# Patient Record
Sex: MALE | Race: WHITE | NOT HISPANIC OR LATINO | Employment: FULL TIME | URBAN - METROPOLITAN AREA
[De-identification: names, ages, dates, MRNs, and addresses within clinical notes are randomized per-mention and may not be internally consistent; named-entity substitution may affect disease eponyms.]

---

## 2023-12-05 ENCOUNTER — APPOINTMENT (OUTPATIENT)
Dept: HEMATOLOGY/ONCOLOGY | Facility: HOSPITAL | Age: 43
End: 2023-12-05
Payer: COMMERCIAL

## 2024-01-02 ENCOUNTER — APPOINTMENT (OUTPATIENT)
Dept: HEMATOLOGY/ONCOLOGY | Facility: HOSPITAL | Age: 44
End: 2024-01-02

## 2024-01-18 ASSESSMENT — DERMATOLOGY QUALITY OF LIFE (QOL) ASSESSMENT
WHAT SINGLE SKIN CONDITION LISTED BELOW IS THE PATIENT ANSWERING THE QUALITY-OF-LIFE ASSESSMENT QUESTIONS ABOUT: NONE OF THE ABOVE
RATE HOW BOTHERED YOU ARE BY SYMPTOMS OF YOUR SKIN PROBLEM (EG, ITCHING, STINGING BURNING, HURTING OR SKIN IRRITATION): 0 - NEVER BOTHERED
RATE HOW BOTHERED YOU ARE BY SYMPTOMS OF YOUR SKIN PROBLEM (EG, ITCHING, STINGING BURNING, HURTING OR SKIN IRRITATION): 0 - NEVER BOTHERED
RATE HOW EMOTIONALLY BOTHERED YOU ARE BY YOUR SKIN PROBLEM (FOR EXAMPLE, WORRY, EMBARRASSMENT, FRUSTRATION): 0 - NEVER BOTHERED
RATE HOW EMOTIONALLY BOTHERED YOU ARE BY YOUR SKIN PROBLEM (FOR EXAMPLE, WORRY, EMBARRASSMENT, FRUSTRATION): 0 - NEVER BOTHERED
RATE HOW BOTHERED YOU ARE BY EFFECTS OF YOUR SKIN PROBLEMS ON YOUR ACTIVITIES (EG, GOING OUT, ACCOMPLISHING WHAT YOU WANT, WORK ACTIVITIES OR YOUR RELATIONSHIPS WITH OTHERS): 0 - NEVER BOTHERED
WHAT SINGLE SKIN CONDITION LISTED BELOW IS THE PATIENT ANSWERING THE QUALITY-OF-LIFE ASSESSMENT QUESTIONS ABOUT: NONE OF THE ABOVE
RATE HOW BOTHERED YOU ARE BY EFFECTS OF YOUR SKIN PROBLEMS ON YOUR ACTIVITIES (EG, GOING OUT, ACCOMPLISHING WHAT YOU WANT, WORK ACTIVITIES OR YOUR RELATIONSHIPS WITH OTHERS): 0 - NEVER BOTHERED

## 2024-01-18 ASSESSMENT — PATIENT GLOBAL ASSESSMENT (PGA): WHAT IS THE PGA: PATIENT GLOBAL ASSESSMENT:  1 - CLEAR

## 2024-01-22 ENCOUNTER — OFFICE VISIT (OUTPATIENT)
Dept: DERMATOLOGY | Facility: CLINIC | Age: 44
End: 2024-01-22
Payer: COMMERCIAL

## 2024-01-22 DIAGNOSIS — L81.4 LENTIGO: ICD-10-CM

## 2024-01-22 PROCEDURE — 99214 OFFICE O/P EST MOD 30 MIN: CPT | Performed by: SPECIALIST

## 2024-01-22 NOTE — PROGRESS NOTES
Patient is here for a full body exam. Full body exam done in the presence of chaperone. Eyes:  Conjunctiva normal lids normal. Mouth and throat: Lips normal teeth normal gums normal.  Oropharynx is moist and normal. Neck: Neck is supple without masses. CV: Extremities are  normal without calf tenderness edema varicosities. Abdomen: Is no hepatosplenomegaly.  Lymphatic: Is no cervical axillary or inguinal lymphadenopathy. Extremities: Inspection palpation  of digits and nails is normal without clubbing or cyanosis. Neuro/psych: Orientation to time,  place, person situation is normal. Mood/affect is normal. Skin: Palpation of scalp is normal  inspection of Hair and scalp, eyebrows, face, and extremities normal. Inspection/palpation of  Head/face mild photo damage. Neck mild photo damage. Chest mild photo damage. Breasts are  normal axillary vaults are normal. Abdomen normal.   Genitalia normal groin normal, buttocks normal. Back mild photo damage. Right upper extremity photo damage. Left upper extremity photo damage. Right lower extremity photo damage. Left lower extremity normal. Inspection of eccrine apocrine glands of skin and subcutaneous tissues normal.

## 2024-01-22 NOTE — PROGRESS NOTES
Yuridia Camacho is a 43 y.o. male who presents for the following: Body Exam.     Review of Systems:  No other skin or systemic complaints other than what is documented elsewhere in the note.    The following portions of the chart were reviewed this encounter and updated as appropriate:            Specialty Problems    None       Objective   Well appearing patient in no apparent distress; mood and affect are within normal limits.    A focused skin examination was performed. All findings within normal limits unless otherwise noted below.    Assessment/Plan   1. Lentigo        Assessment/Plan: No lesions suspicious for malignancy are noted.  Patient does have mild to moderate photodamage involving his chest and back.    Patient is here for a full body exam. Full body exam done in the presence of chaperone.     Eyes: Conjunctiva normal lids normal. Mouth and throat: Lips normal teeth normal gums normal.  Oropharynx is moist and normal.   Neck: Neck is supple without masses.   CV: Extremities are  normal without calf tenderness edema varicosities.   Abdomen: Is no hepatosplenomegaly.  Lymphatic: Is no cervical axillary or inguinal lymphadenopathy.   Extremities: Inspection palpation  of digits and nails is normal without clubbing or cyanosis.   Neuro/psych: Orientation to time,  place, person situation is normal.   Mood/affect is normal.   Skin: Palpation of scalp is normal  inspection of Hair and scalp, eyebrows, face, and extremities normal. Inspection/palpation of  Head/face mild photo damage. Neck mild photo damage. Chest mild photo damage. Breasts are  normal axillary vaults are normal. Abdomen normal.   Genitalia normal groin normal, buttocks normal. Back mild photo damage. Right upper extremity photo damage. Left upper extremity photo damage. Right lower extremity photo damage. Left lower extremity normal. Inspection of eccrine apocrine glands of skin and subcutaneous tissues normal.

## 2025-10-08 ENCOUNTER — APPOINTMENT (OUTPATIENT)
Dept: DERMATOLOGY | Facility: CLINIC | Age: 45
End: 2025-10-08
Payer: COMMERCIAL